# Patient Record
Sex: FEMALE | URBAN - METROPOLITAN AREA
[De-identification: names, ages, dates, MRNs, and addresses within clinical notes are randomized per-mention and may not be internally consistent; named-entity substitution may affect disease eponyms.]

---

## 2022-08-16 ENCOUNTER — OFFICE VISIT (OUTPATIENT)
Dept: HEMATOLOGY | Age: 82
End: 2022-08-16
Payer: MEDICARE

## 2022-08-16 ENCOUNTER — HOSPITAL ENCOUNTER (OUTPATIENT)
Dept: LAB | Age: 82
Discharge: HOME | End: 2022-08-16
Payer: MEDICARE

## 2022-08-16 VITALS
HEIGHT: 62 IN | HEART RATE: 81 BPM | DIASTOLIC BLOOD PRESSURE: 60 MMHG | WEIGHT: 125 LBS | TEMPERATURE: 97.8 F | BODY MASS INDEX: 23 KG/M2 | SYSTOLIC BLOOD PRESSURE: 106 MMHG | RESPIRATION RATE: 14 BRPM | OXYGEN SATURATION: 97 %

## 2022-08-16 DIAGNOSIS — R94.5 ABNORMAL RESULTS OF LIVER FUNCTION STUDIES: ICD-10-CM

## 2022-08-16 DIAGNOSIS — Z11.59 ENCOUNTER FOR SCREENING FOR OTHER VIRAL DISEASES: ICD-10-CM

## 2022-08-16 DIAGNOSIS — R74.8 ELEVATED LIVER ENZYMES: Primary | ICD-10-CM

## 2022-08-16 DIAGNOSIS — R74.8 ELEVATED LIVER ENZYMES: ICD-10-CM

## 2022-08-16 PROBLEM — K74.60 CIRRHOSIS OF LIVER WITHOUT ASCITES (HCC): Status: ACTIVE | Noted: 2022-08-16

## 2022-08-16 PROBLEM — F03.90 DEMENTIA (HCC): Status: ACTIVE | Noted: 2022-08-16

## 2022-08-16 PROBLEM — I10 HYPERTENSION: Status: ACTIVE | Noted: 2022-08-16

## 2022-08-16 LAB
ALBUMIN SERPL-MCNC: 2.7 G/DL (ref 3.4–5)
ALBUMIN/GLOB SERPL: 0.4 {RATIO} (ref 0.8–1.7)
ALP SERPL-CCNC: 136 U/L (ref 45–117)
ALT SERPL-CCNC: 29 U/L (ref 13–56)
ANION GAP SERPL CALC-SCNC: 5 MMOL/L (ref 3–18)
AST SERPL-CCNC: 55 U/L (ref 10–38)
BASOPHILS # BLD: 0 K/UL (ref 0–0.1)
BASOPHILS NFR BLD: 1 % (ref 0–2)
BILIRUB DIRECT SERPL-MCNC: 0.2 MG/DL (ref 0–0.2)
BILIRUB SERPL-MCNC: 0.4 MG/DL (ref 0.2–1)
BUN SERPL-MCNC: 21 MG/DL (ref 7–18)
BUN/CREAT SERPL: 15 (ref 12–20)
CALCIUM SERPL-MCNC: 8.4 MG/DL (ref 8.5–10.1)
CHLORIDE SERPL-SCNC: 113 MMOL/L (ref 100–111)
CO2 SERPL-SCNC: 23 MMOL/L (ref 21–32)
CREAT SERPL-MCNC: 1.4 MG/DL (ref 0.6–1.3)
DIFFERENTIAL METHOD BLD: ABNORMAL
EOSINOPHIL # BLD: 0.1 K/UL (ref 0–0.4)
EOSINOPHIL NFR BLD: 2 % (ref 0–5)
ERYTHROCYTE [DISTWIDTH] IN BLOOD BY AUTOMATED COUNT: 15.9 % (ref 11.6–14.5)
FERRITIN SERPL-MCNC: 13 NG/ML (ref 8–388)
GLOBULIN SER CALC-MCNC: 6.6 G/DL (ref 2–4)
GLUCOSE SERPL-MCNC: 87 MG/DL (ref 74–99)
HCT VFR BLD AUTO: 27.1 % (ref 35–45)
HGB BLD-MCNC: 8.3 G/DL (ref 12–16)
IMM GRANULOCYTES # BLD AUTO: 0 K/UL (ref 0–0.04)
IMM GRANULOCYTES NFR BLD AUTO: 0 % (ref 0–0.5)
INR PPP: 1.3 (ref 0.8–1.2)
IRON SATN MFR SERPL: 9 % (ref 20–50)
IRON SERPL-MCNC: 28 UG/DL (ref 50–175)
LYMPHOCYTES # BLD: 1.7 K/UL (ref 0.9–3.6)
LYMPHOCYTES NFR BLD: 32 % (ref 21–52)
MCH RBC QN AUTO: 28.7 PG (ref 24–34)
MCHC RBC AUTO-ENTMCNC: 30.6 G/DL (ref 31–37)
MCV RBC AUTO: 93.8 FL (ref 78–100)
MONOCYTES # BLD: 0.4 K/UL (ref 0.05–1.2)
MONOCYTES NFR BLD: 7 % (ref 3–10)
NEUTS SEG # BLD: 3 K/UL (ref 1.8–8)
NEUTS SEG NFR BLD: 57 % (ref 40–73)
NRBC # BLD: 0 K/UL (ref 0–0.01)
NRBC BLD-RTO: 0 PER 100 WBC
PLATELET # BLD AUTO: 137 K/UL (ref 135–420)
PMV BLD AUTO: 14.5 FL (ref 9.2–11.8)
POTASSIUM SERPL-SCNC: 4.2 MMOL/L (ref 3.5–5.5)
PROT SERPL-MCNC: 9.3 G/DL (ref 6.4–8.2)
PROTHROMBIN TIME: 16.2 SEC (ref 11.5–15.2)
RBC # BLD AUTO: 2.89 M/UL (ref 4.2–5.3)
SODIUM SERPL-SCNC: 141 MMOL/L (ref 136–145)
TIBC SERPL-MCNC: 326 UG/DL (ref 250–450)
WBC # BLD AUTO: 5.3 K/UL (ref 4.6–13.2)

## 2022-08-16 PROCEDURE — 85610 PROTHROMBIN TIME: CPT

## 2022-08-16 PROCEDURE — 36415 COLL VENOUS BLD VENIPUNCTURE: CPT

## 2022-08-16 PROCEDURE — 82107 ALPHA-FETOPROTEIN L3: CPT

## 2022-08-16 PROCEDURE — 82728 ASSAY OF FERRITIN: CPT

## 2022-08-16 PROCEDURE — 86038 ANTINUCLEAR ANTIBODIES: CPT

## 2022-08-16 PROCEDURE — G8432 DEP SCR NOT DOC, RNG: HCPCS | Performed by: NURSE PRACTITIONER

## 2022-08-16 PROCEDURE — G8427 DOCREV CUR MEDS BY ELIG CLIN: HCPCS | Performed by: NURSE PRACTITIONER

## 2022-08-16 PROCEDURE — 86803 HEPATITIS C AB TEST: CPT

## 2022-08-16 PROCEDURE — 86704 HEP B CORE ANTIBODY TOTAL: CPT

## 2022-08-16 PROCEDURE — 1090F PRES/ABSN URINE INCON ASSESS: CPT | Performed by: NURSE PRACTITIONER

## 2022-08-16 PROCEDURE — G8420 CALC BMI NORM PARAMETERS: HCPCS | Performed by: NURSE PRACTITIONER

## 2022-08-16 PROCEDURE — 83540 ASSAY OF IRON: CPT

## 2022-08-16 PROCEDURE — 86381 MITOCHONDRIAL ANTIBODY EACH: CPT

## 2022-08-16 PROCEDURE — 85025 COMPLETE CBC W/AUTO DIFF WBC: CPT

## 2022-08-16 PROCEDURE — 86706 HEP B SURFACE ANTIBODY: CPT

## 2022-08-16 PROCEDURE — 80076 HEPATIC FUNCTION PANEL: CPT

## 2022-08-16 PROCEDURE — G8536 NO DOC ELDER MAL SCRN: HCPCS | Performed by: NURSE PRACTITIONER

## 2022-08-16 PROCEDURE — 87340 HEPATITIS B SURFACE AG IA: CPT

## 2022-08-16 PROCEDURE — 86015 ACTIN ANTIBODY EACH: CPT

## 2022-08-16 PROCEDURE — 86708 HEPATITIS A ANTIBODY: CPT

## 2022-08-16 PROCEDURE — 1101F PT FALLS ASSESS-DOCD LE1/YR: CPT | Performed by: NURSE PRACTITIONER

## 2022-08-16 PROCEDURE — 1123F ACP DISCUSS/DSCN MKR DOCD: CPT | Performed by: NURSE PRACTITIONER

## 2022-08-16 PROCEDURE — 86037 ANCA TITER EACH ANTIBODY: CPT

## 2022-08-16 PROCEDURE — G0463 HOSPITAL OUTPT CLINIC VISIT: HCPCS | Performed by: NURSE PRACTITIONER

## 2022-08-16 PROCEDURE — 80048 BASIC METABOLIC PNL TOTAL CA: CPT

## 2022-08-16 PROCEDURE — 99205 OFFICE O/P NEW HI 60 MIN: CPT | Performed by: NURSE PRACTITIONER

## 2022-08-16 PROCEDURE — G8400 PT W/DXA NO RESULTS DOC: HCPCS | Performed by: NURSE PRACTITIONER

## 2022-08-16 RX ORDER — MEMANTINE HYDROCHLORIDE 10 MG/1
10 TABLET ORAL 2 TIMES DAILY
COMMUNITY
Start: 2022-07-26

## 2022-08-16 RX ORDER — METHOCARBAMOL 750 MG/1
TABLET, FILM COATED ORAL
COMMUNITY

## 2022-08-16 RX ORDER — HYDROCODONE BITARTRATE AND ACETAMINOPHEN 5; 325 MG/1; MG/1
TABLET ORAL
COMMUNITY
Start: 2022-07-18

## 2022-08-16 RX ORDER — DONEPEZIL HYDROCHLORIDE 10 MG/1
TABLET, FILM COATED ORAL
COMMUNITY
Start: 2022-08-10

## 2022-08-16 RX ORDER — PANTOPRAZOLE SODIUM 40 MG/1
40 TABLET, DELAYED RELEASE ORAL 2 TIMES DAILY
COMMUNITY
Start: 2022-07-18

## 2022-08-16 RX ORDER — BENAZEPRIL HYDROCHLORIDE AND HYDROCHLOROTHIAZIDE 20; 12.5 MG/1; MG/1
1 TABLET ORAL DAILY
COMMUNITY
Start: 2022-07-26

## 2022-08-16 NOTE — PROGRESS NOTES
[address] [facility], [place], [address], [place], [place]      [facility]     [facility]   JUANITA [facility]       Carter Henson [address]    at [place] [address] [address], [address] Evelio Noland  22. [phone]    FAX: [address] [place] [address], [address]    [phone]    FAX: [phone]       Patient Care Team:  Jaclyn Martinez DO as PCP - General (Family Medicine)  Fay Younger MD as Referring Provider (Hematology and Oncology)  Jaqui Mendez MD as Physician (Hematology and Oncology)  Rand Lofton MD as Physician (Gastroenterology)      The clinicians listed above have asked me to see Jae Orozco in consultation regarding suspected cirrhosis. All medical records sent by the referring physicians were reviewed     The patient is a 81 y.o. female who was found to have elevated liver enzymes since 2018      Serologic evaluation for markers of chronic liver disease has either not been performed or the results are not available. MRI of the liver was performed in 2022. The results of the imaging suggested cirrhosis. Previously described suspicious lesion in the subcapsular posterior right lobeof the liver appears unchanged in size    An assessment of liver fibrosis with biopsy, Fibroscan or elastography has not been performed. The patient had not started any new medications within 3 months preceding the elevation in liver chemistries.     The patient has the following symptoms which could be attributed to the liver disorder:  fatigue, diffuse abdominal pain, hematochezia (occasionally)     The patient is not experiencing the following symptoms which are commonly seen in this liver disorder: pain in the right side over the liver, nausea, swelling of the abdomen, swelling of the lower extremities, hematemesis. The patient has mild to moderate limitations in functional activities which can be attributed to the liver disease and to other medical problems that are not related to the liver disease. ASSESSMENT AND PLAN:  Cirrhosis  Cirrhosis of unclear etiology. The diagnosis of cirrhosis is based upon imaging and laboratory studies. AST is elevated. ALT is normal. ALP is elevated. Liver function is depressed. Serum albumin is depressed. The platelet count is depressed. Based upon laboratory studies and imaging the patient appears to have cirrhosis. Have performed laboratory testing to monitor liver function and degree of liver injury. This included   BMP, hepatic panel, CBC with platelet count, INR. Serologic testing for causes of chronic liver disease were ordered. Will perform additional serologic tests to screen for other causes of chronic liver disease. The patient has depressed but stable liver function. The patient has never developed any complications of cirrhosis to date. The CTP is 7. Child class B. The MELD score is 13. The patient is not a candidate for liver transplantation because of advanced age. Primary Biliary Cholangitis  The diagnosis is based upon serology and an elevation in ALP. A liver biopsy has not been performed. The AMA is strongly positive and the ALP is significantly elevated. The patient meets criteria for PBC and histologic confirmation is not necessary at this time. The patient will be started on SOREN at a dose of 800 mg every day. The side effects of SOREN including a 2% risk of itching and a 2% risk of diarrhea were discussed. Screening for Esophageal varices   The patient has not had an EGD to screen for varices.     She is scheduled for EGD to assess for varices and need for banding with Dr Andrea Lemus 9/2022. Hypercholesterolemia   This is common in patients with PBC. Controlled clinical trials demonstrate that women with PBC do not have an increased risk of CAD depsite the elevated total cholesterol. The cholesterol is typically HDL and does not always require treatment. Statins are not contraindicated if felt to be clinically warranted. Vitamin malabsorption  Patients with PBC do not efficiently absorb fat soluble vitamins A,D,E, K. It has been recommended that the patient take multivitamins with excess fat soluble vitamins. Breast cancer screeing   Women with PBC have an increased risk of breast cancer and should undergo regular mammography screenings. Hepatic encephalopathy   This has not developed to date. There is no need for treatment with lactulose and/or Xifaxan at this time. The patient has dementia and is on medication. 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 There is no evidence of overt bleeding. No treatment is required. 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 Will administer intravenous iron. Screening for Hepatocellular Carcinoma  [facility] [address] screening performed in 4/2022 demonstrated a \"previously described suspicious lesion\" unchanged. AFP was ordered today and dynamic MRI will be scheduled. Treatment of other medical problems in patients with chronic liver disease  There are no contraindications for the patient to take any medications that are necessary for treatment of other medical issues. Normal doses of acetaminophen can be used for pain as needed. Normal doses of acetaminophen as recommended on the label are not hepatotoxic, even in patients with cirrhosis. The patient has cirrhosis. Patients with cirrhosis should not use NSAIDs if possible as this is associated with a higher rate of ZELDA. Counseling for alcohol in patients with chronic liver disease  The patient was counseled regarding alcohol consumption and the effect of alcohol on chronic liver disease. The patient does not consume any alcohol. Osteoporosis  The risk of osteoporosis is increased in patients with cirrhosis. DEXA bone density to assess for osteoporosis has not been performed. This should be ordered by the patients primary care physician. Treatment of other medical problems in patients with chronic liver disease  There are no contraindications for the patient to take most medications that are necessary for treatment of other medical issues. The patient has cirrhosis and should avoid taking NSAIDs which are associated with a higher rate of developing ZELDA. The patient can take any medications utilized for treatment of DM, statins to treat hypercholesterolemia    Normal doses of acetaminophen, as recommended on the label of the bottle, are not hepatotoxic except in the setting of daily alcohol use, even in patients with cirrhosis and can be utilized for pain. Counseling for alcohol in patients with chronic liver disease  The patient has cirrhosis and was advised to be abstinent from all alcohol including non-alcoholic beer which does contain some alcohol. The patient does not consume any alcohol. Vaccinations   Vaccination for viral hepatitis B is recommended since the patient has no serologic evidence of previous exposure or vaccination with immunity. Vaccination for viral hepatitis A is not needed. The patient has serologic evidence of prior exposure or vaccination with immunity. The patient has not received of COVID-19 vaccine. Routine vaccinations against other bacterial and viral agents can be performed as indicated. Annual flu vaccination should be administered if indicated.       ALLERGIES  No Known Allergies    MEDICATIONS  Current Outpatient Medications   Medication Sig benazepril-hydroCHLOROthiazide (LOTENSIN HCT) 20-12.5 mg per tablet Take 1 Tablet by mouth in the morning. HYDROcodone-acetaminophen (NORCO) 5-325 mg per tablet take 1 tablet by mouth every 4 hours if needed for pain    donepeziL (ARICEPT) 10 mg tablet donepezil 10 mg tablet   take 1 tablet by mouth every evening    memantine (NAMENDA) 10 mg tablet Take 10 mg by mouth two (2) times a day. methocarbamoL (ROBAXIN) 750 mg tablet methocarbamol 750 mg tablet    pantoprazole (PROTONIX) 40 mg tablet Take 40 mg by mouth two (2) times a day. No current facility-administered medications for this visit. SYSTEM REVIEW NOT RELATED TO LIVER DISEASE OR REVIEWED ABOVE:  Constitution systems: Negative for fever, chills, weight gain, weight loss. Eyes: Negative for visual changes. ENT: Negative for sore throat, painful swallowing. Respiratory: Negative for cough, hemoptysis, SOB. Cardiology: Negative for chest pain, palpitations. GI:  Negative for constipation or diarrhea. : Negative for urinary frequency, dysuria, hematuria, nocturia. Skin: Negative for rash. Hematology: Negative for easy bruising, blood clots. Musculo-skelatal: Negative for back pain, muscle pain, weakness. Neurologic: Negative for headaches, dizziness, vertigo, memory problems not related to HE. Psychology: Negative for anxiety, depression. FAMILY HISTORY:  The patient has no knowledge of the father's medical condition. The patient has no knowledge of the mother's medical condition. SOCIAL HISTORY:  The patient is . The patient has 4 children, 1 grandchild. 1 great grandchild  The patient has never used tobacco products. The patient has never consumed significant amounts of alcohol. The patient is retired.       PHYSICAL EXAMINATION:  Visit Vitals  /60 (BP 1 Location: Left upper arm, BP Patient Position: Sitting)   Pulse 81   Temp 97.8 °F (36.6 °C) (Temporal)   Resp 14   Ht 5' 2\" (1.575 m)   Wt 125 lb (56.7 kg)   SpO2 97%   BMI 22.86 kg/m²     General: No acute distress. Eyes: Sclera anicteric. ENT: No oral lesions. Thyroid normal.  Nodes: No adenopathy. Skin: No spider angiomata. No jaundice. No palmar erythema. Respiratory: Lungs clear to auscultation. Cardiovascular: Regular heart rate. No murmurs. No JVD. Abdomen: Soft non-tender. Liver size normal to percussion/palpation. Spleen not palpable. No obvious ascites. Extremities: No edema. No muscle wasting. No gross arthritic changes. Neurologic: Alert and oriented. Cranial nerves grossly intact. No asterixis. LABORATORY STUDIES:  Liver [place] of [address] Units 8/16/2022   WBC 4.6 - 13.2 K/uL 5.3   ANC 1.8 - 8.0 K/UL 3.0   HGB 12.0 - 16.0 g/dL 8.3 (L)    - 420 K/uL 137   INR 0.8 - 1.2   1.3 (H)   AST 10 - 38 U/L 55 (H)   ALT 13 - 56 U/L 29   Alk Phos 45 - 117 U/L 136 (H)   Bili, Total 0.2 - 1.0 MG/DL 0.4   Bili, Direct 0.0 - 0.2 MG/DL 0.2   Albumin 3.4 - 5.0 g/dL 2.7 (L)   BUN 7.0 - 18 MG/DL 21 (H)   Creat 0.6 - 1.3 MG/DL 1.40 (H)   Na 136 - 145 mmol/L 141   K 3.5 - 5.5 mmol/L 4.2   Cl 100 - 111 mmol/L 113 (H)   CO2 21 - 32 mmol/L 23   Glucose 74 - 99 mg/dL 87       SEROLOGIES:  Serologies Latest Ref Rng & Units 8/16/2022   Hep A Ab, Total Negative   Positive (A)   Hep B Surface Ag <1.00 Index <0.10   Hep B Surface Ag Interp NEG   Negative   Hep B Core Ab, Total Negative   Negative   Hep B Surface Ab >10.0 mIU/mL <3.10 (L)   Hep B Surface Ab Interp POS   Negative (A)   Hep C Ab 0.0 - 0.9 s/co ratio <0.1   Ferritin 8 - 388 NG/ML 13   Iron % Saturation 20 - 50 % 9 (L)   ASMCA 0 - 19 Units 59 (H)   M2 Ab 0.0 - 20.0 Units 120.1 (H)       LIVER HISTOLOGY:  Not available or performed    ENDOSCOPIC PROCEDURES:  Not available or performed    RADIOLOGY:  4/2022. Dynamic MRI liver. Changes consistent with cirrhosis.  Previously described suspicious lesion in the subcapsular posterior right lobe of the liver appears unchanged in size. No dilated bile ducts. No bile duct strictures. No ascites. OTHER TESTING:  Not available or performed    FOLLOW-UP:  All of the issues listed above in the Assessment and Plan were discussed with the patient. All questions were answered. The patient expressed a clear understanding of the above. [address] [place] [address] in 4 weeks for Fibroscan and to review all data and determine the treatment plan.     Heriberto Pina, AGPCNP-BC  [address] [facility] [place] [facility]  [address], [address] [address]  Tanesha Mandri, [address]  [phone]

## 2022-08-17 LAB
AFP L3 MFR SERPL: NORMAL % (ref 0–9.9)
AFP SERPL-MCNC: 1.4 NG/ML (ref 0–8.7)
HAV AB SER QL IA: POSITIVE
HBV CORE AB SERPL QL IA: NEGATIVE
HBV SURFACE AB SER QL IA: NEGATIVE
HBV SURFACE AB SERPL IA-ACNC: <3.1 MIU/ML
HBV SURFACE AG SER QL: <0.1 INDEX
HBV SURFACE AG SER QL: NEGATIVE
HCV AB S/CO SERPL IA: <0.1 S/CO RATIO (ref 0–0.9)
HCV AB SERPL QL IA: NORMAL
HEP BS AB COMMENT,HBSAC: ABNORMAL
MITOCHONDRIA M2 IGG SER-ACNC: 120.1 UNITS (ref 0–20)
SMA IGG SER-ACNC: 59 UNITS (ref 0–19)

## 2022-08-19 LAB
C-ANCA TITR SER IF: NORMAL TITER
P-ANCA ATYPICAL TITR SER IF: NORMAL TITER
P-ANCA TITR SER IF: NORMAL TITER

## 2022-08-22 RX ORDER — URSODIOL 300 MG/1
300 CAPSULE ORAL DAILY
Qty: 90 CAPSULE | Refills: 3 | Status: SHIPPED | OUTPATIENT
Start: 2022-08-22

## 2022-08-22 RX ORDER — URSODIOL 500 MG/1
500 TABLET, FILM COATED ORAL DAILY
Qty: 90 TABLET | Refills: 3 | Status: SHIPPED | OUTPATIENT
Start: 2022-08-22

## 2022-08-25 LAB — ANA TITR SER IF: NEGATIVE {TITER}

## 2022-08-29 PROBLEM — D50.9 IRON DEFICIENCY ANEMIA, UNSPECIFIED: Status: ACTIVE | Noted: 2022-08-29

## 2022-08-29 RX ORDER — DIPHENHYDRAMINE HYDROCHLORIDE 50 MG/ML
25 INJECTION, SOLUTION INTRAMUSCULAR; INTRAVENOUS AS NEEDED
Status: CANCELLED
Start: 2022-09-01

## 2022-08-29 RX ORDER — HYDROCORTISONE SODIUM SUCCINATE 100 MG/2ML
100 INJECTION, POWDER, FOR SOLUTION INTRAMUSCULAR; INTRAVENOUS AS NEEDED
Status: CANCELLED | OUTPATIENT
Start: 2022-09-01

## 2022-08-29 RX ORDER — ALBUTEROL SULFATE 0.83 MG/ML
2.5 SOLUTION RESPIRATORY (INHALATION) AS NEEDED
Status: CANCELLED
Start: 2022-09-01

## 2022-08-29 RX ORDER — HEPARIN 100 UNIT/ML
500 SYRINGE INTRAVENOUS AS NEEDED
Status: CANCELLED
Start: 2022-09-01

## 2022-08-29 RX ORDER — SODIUM CHLORIDE 9 MG/ML
5-250 INJECTION, SOLUTION INTRAVENOUS AS NEEDED
Status: CANCELLED | OUTPATIENT
Start: 2022-09-01

## 2022-08-29 RX ORDER — DIPHENHYDRAMINE HYDROCHLORIDE 50 MG/ML
50 INJECTION, SOLUTION INTRAMUSCULAR; INTRAVENOUS AS NEEDED
Status: CANCELLED
Start: 2022-09-01

## 2022-08-29 RX ORDER — SODIUM CHLORIDE 9 MG/ML
5-40 INJECTION INTRAMUSCULAR; INTRAVENOUS; SUBCUTANEOUS AS NEEDED
Status: CANCELLED | OUTPATIENT
Start: 2022-09-01

## 2022-08-29 RX ORDER — ACETAMINOPHEN 325 MG/1
650 TABLET ORAL AS NEEDED
Status: CANCELLED
Start: 2022-09-01

## 2022-08-29 RX ORDER — ONDANSETRON 2 MG/ML
8 INJECTION INTRAMUSCULAR; INTRAVENOUS AS NEEDED
Status: CANCELLED | OUTPATIENT
Start: 2022-09-01

## 2022-08-29 RX ORDER — EPINEPHRINE 1 MG/ML
0.3 INJECTION, SOLUTION, CONCENTRATE INTRAVENOUS AS NEEDED
Status: CANCELLED | OUTPATIENT
Start: 2022-09-01

## 2022-09-01 ENCOUNTER — TELEPHONE (OUTPATIENT)
Dept: HEMATOLOGY | Age: 82
End: 2022-09-01

## 2022-09-01 ENCOUNTER — HOSPITAL ENCOUNTER (OUTPATIENT)
Dept: INFUSION THERAPY | Age: 82
Discharge: HOME | End: 2022-09-01
Payer: MEDICARE

## 2022-09-01 VITALS
RESPIRATION RATE: 16 BRPM | TEMPERATURE: 97.8 F | OXYGEN SATURATION: 100 % | HEART RATE: 75 BPM | DIASTOLIC BLOOD PRESSURE: 50 MMHG | SYSTOLIC BLOOD PRESSURE: 109 MMHG

## 2022-09-01 DIAGNOSIS — D50.9 IRON DEFICIENCY ANEMIA, UNSPECIFIED IRON DEFICIENCY ANEMIA TYPE: Primary | ICD-10-CM

## 2022-09-01 PROCEDURE — 96365 THER/PROPH/DIAG IV INF INIT: CPT

## 2022-09-01 PROCEDURE — 74011000258 HC RX REV CODE- 258: Performed by: NURSE PRACTITIONER

## 2022-09-01 PROCEDURE — 74011000250 HC RX REV CODE- 250: Performed by: NURSE PRACTITIONER

## 2022-09-01 PROCEDURE — 74011250636 HC RX REV CODE- 250/636: Performed by: NURSE PRACTITIONER

## 2022-09-01 RX ORDER — DIPHENHYDRAMINE HYDROCHLORIDE 50 MG/ML
25 INJECTION, SOLUTION INTRAMUSCULAR; INTRAVENOUS AS NEEDED
Status: CANCELLED
Start: 2022-09-08

## 2022-09-01 RX ORDER — ONDANSETRON 2 MG/ML
8 INJECTION INTRAMUSCULAR; INTRAVENOUS AS NEEDED
Status: CANCELLED | OUTPATIENT
Start: 2022-09-08

## 2022-09-01 RX ORDER — HYDROCORTISONE SODIUM SUCCINATE 100 MG/2ML
100 INJECTION, POWDER, FOR SOLUTION INTRAMUSCULAR; INTRAVENOUS AS NEEDED
Status: CANCELLED | OUTPATIENT
Start: 2022-09-08

## 2022-09-01 RX ORDER — SODIUM CHLORIDE 9 MG/ML
5-250 INJECTION, SOLUTION INTRAVENOUS AS NEEDED
Status: DISPENSED | OUTPATIENT
Start: 2022-09-01 | End: 2022-09-01

## 2022-09-01 RX ORDER — HEPARIN 100 UNIT/ML
500 SYRINGE INTRAVENOUS AS NEEDED
Status: CANCELLED
Start: 2022-09-08

## 2022-09-01 RX ORDER — EPINEPHRINE 1 MG/ML
0.3 INJECTION, SOLUTION, CONCENTRATE INTRAVENOUS AS NEEDED
Status: CANCELLED | OUTPATIENT
Start: 2022-09-08

## 2022-09-01 RX ORDER — SODIUM CHLORIDE 9 MG/ML
5-250 INJECTION, SOLUTION INTRAVENOUS AS NEEDED
Status: CANCELLED | OUTPATIENT
Start: 2022-09-08

## 2022-09-01 RX ORDER — ACETAMINOPHEN 325 MG/1
650 TABLET ORAL AS NEEDED
Status: CANCELLED
Start: 2022-09-08

## 2022-09-01 RX ORDER — SODIUM CHLORIDE 0.9 % (FLUSH) 0.9 %
5-40 SYRINGE (ML) INJECTION AS NEEDED
Status: CANCELLED | OUTPATIENT
Start: 2022-09-08

## 2022-09-01 RX ORDER — SODIUM CHLORIDE 9 MG/ML
5-40 INJECTION INTRAMUSCULAR; INTRAVENOUS; SUBCUTANEOUS AS NEEDED
Status: CANCELLED | OUTPATIENT
Start: 2022-09-08

## 2022-09-01 RX ORDER — ALBUTEROL SULFATE 0.83 MG/ML
2.5 SOLUTION RESPIRATORY (INHALATION) AS NEEDED
Status: CANCELLED
Start: 2022-09-08

## 2022-09-01 RX ORDER — SODIUM CHLORIDE 0.9 % (FLUSH) 0.9 %
5-40 SYRINGE (ML) INJECTION AS NEEDED
Status: DISPENSED | OUTPATIENT
Start: 2022-09-01 | End: 2022-09-01

## 2022-09-01 RX ORDER — DIPHENHYDRAMINE HYDROCHLORIDE 50 MG/ML
50 INJECTION, SOLUTION INTRAMUSCULAR; INTRAVENOUS AS NEEDED
Status: CANCELLED
Start: 2022-09-08

## 2022-09-01 RX ADMIN — SODIUM CHLORIDE, PRESERVATIVE FREE 10 ML: 5 INJECTION INTRAVENOUS at 11:12

## 2022-09-01 RX ADMIN — SODIUM CHLORIDE, PRESERVATIVE FREE 10 ML: 5 INJECTION INTRAVENOUS at 11:37

## 2022-09-01 RX ADMIN — FERRIC CARBOXYMALTOSE INJECTION 750 MG: 50 INJECTION, SOLUTION INTRAVENOUS at 10:50

## 2022-09-01 RX ADMIN — SODIUM CHLORIDE 250 ML/HR: 9 INJECTION, SOLUTION INTRAVENOUS at 10:50

## 2022-09-01 NOTE — TELEPHONE ENCOUNTER
Received a message today 9/1/22 to follow up with patient due to having back pain after taking Hernandez.     Contacted patient to follow up, no answer, voicemail full

## 2022-09-01 NOTE — PROGRESS NOTES
[facility] Progress Note    Date: 2022    Name: Maria G Flowers    MRN: [id number]         : 1940    Injectafer Infusion 1 of 2    Ms. Hughes to [facility], ambulatory, at 1000. Pt was assessed and education was provided. Ms. Yoselin Mejia vitals were reviewed and patient was observed for 5 minutes prior to treatment. Visit Vitals  BP (!) 109/50 (BP 1 Location: Right arm, BP Patient Position: Sitting)   Pulse 75   Temp 97.8 °F (36.6 °C)   Resp 16   SpO2 100%   Breastfeeding No         24 g PIV placed in right arm x 1 attempt. PIV flushed easily and had brisk blood return. Injectafer 750 mg was to infuse over 20 minutes. VS stable and pt denied complaints of itching, lip/tongue/facial swelling, SOB, CP or other complaints. Pt was observed for 30 minutes post infusion. Ms. John Ruffin tolerated the infusion, and had no complaints. VS remained stable. PIV flushed with NS 10 ml and removed. No bleeding or hematoma noted at site. Guaze and coban applied. Reviewed discharge instructions with patient, including expected side effects (abdominal cramping, nausea, changes in color of urine or feces) and signs of allergic reaction requiring medical attention (itching/hives/rashes, SOB, chest pain, lip/tongue/facial swelling). Patient given printed copy to take home. Patient verbalized understanding of discharge instructions. Patient armband removed and shredded. Ms. John Ruffin was discharged from [place] [address] in stable condition at 1140. She is to return on 2022 at 1000 for 2/2 Injectafer treatment.     Erlin Solorzano RN  2022  12:16 PM

## 2022-09-08 ENCOUNTER — HOSPITAL ENCOUNTER (OUTPATIENT)
Dept: INFUSION THERAPY | Age: 82
Discharge: HOME | End: 2022-09-08
Payer: MEDICARE

## 2022-09-08 VITALS
TEMPERATURE: 98 F | HEART RATE: 68 BPM | DIASTOLIC BLOOD PRESSURE: 60 MMHG | RESPIRATION RATE: 16 BRPM | OXYGEN SATURATION: 100 % | SYSTOLIC BLOOD PRESSURE: 130 MMHG

## 2022-09-08 DIAGNOSIS — D50.9 IRON DEFICIENCY ANEMIA, UNSPECIFIED IRON DEFICIENCY ANEMIA TYPE: Primary | ICD-10-CM

## 2022-09-08 PROCEDURE — 74011000250 HC RX REV CODE- 250: Performed by: NURSE PRACTITIONER

## 2022-09-08 PROCEDURE — 74011250636 HC RX REV CODE- 250/636: Performed by: NURSE PRACTITIONER

## 2022-09-08 PROCEDURE — 96365 THER/PROPH/DIAG IV INF INIT: CPT

## 2022-09-08 PROCEDURE — 74011000258 HC RX REV CODE- 258: Performed by: NURSE PRACTITIONER

## 2022-09-08 RX ORDER — ONDANSETRON 2 MG/ML
8 INJECTION INTRAMUSCULAR; INTRAVENOUS AS NEEDED
Status: CANCELLED | OUTPATIENT
Start: 2022-09-15

## 2022-09-08 RX ORDER — DIPHENHYDRAMINE HYDROCHLORIDE 50 MG/ML
50 INJECTION, SOLUTION INTRAMUSCULAR; INTRAVENOUS AS NEEDED
Status: CANCELLED
Start: 2022-09-15

## 2022-09-08 RX ORDER — SODIUM CHLORIDE 9 MG/ML
5-250 INJECTION, SOLUTION INTRAVENOUS AS NEEDED
Status: CANCELLED | OUTPATIENT
Start: 2022-09-15

## 2022-09-08 RX ORDER — SODIUM CHLORIDE 0.9 % (FLUSH) 0.9 %
5-40 SYRINGE (ML) INJECTION AS NEEDED
Status: CANCELLED | OUTPATIENT
Start: 2022-09-15

## 2022-09-08 RX ORDER — DIPHENHYDRAMINE HYDROCHLORIDE 50 MG/ML
25 INJECTION, SOLUTION INTRAMUSCULAR; INTRAVENOUS AS NEEDED
Status: CANCELLED
Start: 2022-09-15

## 2022-09-08 RX ORDER — HYDROCORTISONE SODIUM SUCCINATE 100 MG/2ML
100 INJECTION, POWDER, FOR SOLUTION INTRAMUSCULAR; INTRAVENOUS AS NEEDED
Status: CANCELLED | OUTPATIENT
Start: 2022-09-15

## 2022-09-08 RX ORDER — ACETAMINOPHEN 325 MG/1
650 TABLET ORAL AS NEEDED
Status: CANCELLED
Start: 2022-09-15

## 2022-09-08 RX ORDER — ALBUTEROL SULFATE 0.83 MG/ML
2.5 SOLUTION RESPIRATORY (INHALATION) AS NEEDED
Status: CANCELLED
Start: 2022-09-15

## 2022-09-08 RX ORDER — SODIUM CHLORIDE 9 MG/ML
5-250 INJECTION, SOLUTION INTRAVENOUS AS NEEDED
Status: DISPENSED | OUTPATIENT
Start: 2022-09-08 | End: 2022-09-08

## 2022-09-08 RX ORDER — SODIUM CHLORIDE 0.9 % (FLUSH) 0.9 %
5-40 SYRINGE (ML) INJECTION AS NEEDED
Status: DISPENSED | OUTPATIENT
Start: 2022-09-08 | End: 2022-09-08

## 2022-09-08 RX ORDER — SODIUM CHLORIDE 9 MG/ML
5-40 INJECTION INTRAMUSCULAR; INTRAVENOUS; SUBCUTANEOUS AS NEEDED
Status: CANCELLED | OUTPATIENT
Start: 2022-09-15

## 2022-09-08 RX ORDER — EPINEPHRINE 1 MG/ML
0.3 INJECTION, SOLUTION, CONCENTRATE INTRAVENOUS AS NEEDED
Status: CANCELLED | OUTPATIENT
Start: 2022-09-15

## 2022-09-08 RX ORDER — HEPARIN 100 UNIT/ML
500 SYRINGE INTRAVENOUS AS NEEDED
Status: CANCELLED
Start: 2022-09-15

## 2022-09-08 RX ADMIN — SODIUM CHLORIDE 50 ML/HR: 9 INJECTION, SOLUTION INTRAVENOUS at 11:52

## 2022-09-08 RX ADMIN — FERRIC CARBOXYMALTOSE INJECTION 750 MG: 50 INJECTION, SOLUTION INTRAVENOUS at 11:52

## 2022-09-08 RX ADMIN — SODIUM CHLORIDE, PRESERVATIVE FREE 10 ML: 5 INJECTION INTRAVENOUS at 12:23

## 2022-09-08 NOTE — PROGRESS NOTES
[facility] Progress Note    Date: 2022    Name: Catalino Hall    MRN: [id number]         : 1940    Injectafer Infusion 2 of 2    Ms. Nancie Ornelas to [place], [facility], at 1030 for second dose of Injectafer accompanied by her friend. Pt was assessed and education was provided. Ms. Shellie Saucedo vitals were reviewed and patient was observed for 5 minutes prior to treatment. Visit Vitals  /60 (BP 1 Location: Right arm, BP Patient Position: Sitting)   Pulse 68   Temp 98 °F (36.7 °C)   Resp 16   SpO2 100%         24 g PIV placed in right arm x 1 attempt. PIV flushed easily and had brisk blood return. Injectafer 750 mg was to infuse over 20 minutes. VS stable and pt denied complaints of itching, lip/tongue/facial swelling, SOB, CP or other complaints. Ms. aNncie Ornelas tolerated the infusion, and had no complaints. VS remained stable. PIV flushed with NS 10 ml and removed. No bleeding or hematoma noted at site. Guaze and coban applied. Reviewed discharge instructions with patient, including expected side effects (abdominal cramping, nausea, changes in color of urine or feces) and signs of allergic reaction requiring medical attention. Pt verbalized understanding. Patient armband removed and shredded. Ms. Nancie Ornelas was discharged from [place] [address] in stable condition at [phone]. She has no further appointments scheduled for [facility] at this time.     Corinne France, RN  2022  12:16 PM

## 2022-09-22 ENCOUNTER — TELEPHONE (OUTPATIENT)
Dept: HEMATOLOGY | Age: 82
End: 2022-09-22

## 2022-09-22 NOTE — TELEPHONE ENCOUNTER
Called patient to follow up and see if she was still experiencing back pain and if she is still currently off Hernandez rx. Patient states she is currently still off the Hernandez rx and back pain is still present but minimum. Patient was already aware that JOHN Hughes believes that the back pain was not from [place]. Patient reminded of follow up fibroscan appointment and instructions.  Patient verbalized understanding

## 2022-11-24 ENCOUNTER — APPOINTMENT (OUTPATIENT)
Dept: GENERAL RADIOLOGY | Age: 82
End: 2022-11-24
Attending: EMERGENCY MEDICINE
Payer: MEDICARE

## 2022-11-24 ENCOUNTER — HOSPITAL ENCOUNTER (EMERGENCY)
Age: 82
Discharge: HOME | End: 2022-11-24
Attending: EMERGENCY MEDICINE
Payer: MEDICARE

## 2022-11-24 VITALS
OXYGEN SATURATION: 100 % | TEMPERATURE: 97.3 F | BODY MASS INDEX: 22.5 KG/M2 | RESPIRATION RATE: 15 BRPM | DIASTOLIC BLOOD PRESSURE: 52 MMHG | HEART RATE: 87 BPM | WEIGHT: 123 LBS | SYSTOLIC BLOOD PRESSURE: 122 MMHG

## 2022-11-24 DIAGNOSIS — R06.89 RESPIRATORY DEPRESSION: ICD-10-CM

## 2022-11-24 DIAGNOSIS — T40.2X4D OPIOID OVERDOSE, UNDETERMINED INTENT, SUBSEQUENT ENCOUNTER: Primary | ICD-10-CM

## 2022-11-24 LAB
ALBUMIN SERPL-MCNC: 2.5 G/DL (ref 3.4–5)
ALBUMIN/GLOB SERPL: 0.4 {RATIO} (ref 0.8–1.7)
ALP SERPL-CCNC: 197 U/L (ref 45–117)
ALT SERPL-CCNC: 25 U/L (ref 13–56)
AMMONIA PLAS-SCNC: 24 UMOL/L (ref 11–32)
AMPHET UR QL SCN: NEGATIVE
ANION GAP SERPL CALC-SCNC: 9 MMOL/L (ref 3–18)
APPEARANCE UR: CLEAR
AST SERPL-CCNC: 48 U/L (ref 10–38)
BACTERIA URNS QL MICRO: ABNORMAL /HPF
BARBITURATES UR QL SCN: NEGATIVE
BASOPHILS # BLD: 0 K/UL (ref 0–0.1)
BASOPHILS NFR BLD: 1 % (ref 0–2)
BENZODIAZ UR QL: NEGATIVE
BILIRUB SERPL-MCNC: 0.7 MG/DL (ref 0.2–1)
BILIRUB UR QL: NEGATIVE
BUN SERPL-MCNC: 16 MG/DL (ref 7–18)
BUN/CREAT SERPL: 10 (ref 12–20)
CALCIUM SERPL-MCNC: 8.6 MG/DL (ref 8.5–10.1)
CANNABINOIDS UR QL SCN: NEGATIVE
CHLORIDE SERPL-SCNC: 111 MMOL/L (ref 100–111)
CO2 SERPL-SCNC: 19 MMOL/L (ref 21–32)
COCAINE UR QL SCN: NEGATIVE
COLOR UR: ABNORMAL
CREAT SERPL-MCNC: 1.59 MG/DL (ref 0.6–1.3)
DIFFERENTIAL METHOD BLD: ABNORMAL
EOSINOPHIL # BLD: 0.1 K/UL (ref 0–0.4)
EOSINOPHIL NFR BLD: 2 % (ref 0–5)
EPITH CASTS URNS QL MICRO: ABNORMAL /LPF (ref 0–5)
ERYTHROCYTE [DISTWIDTH] IN BLOOD BY AUTOMATED COUNT: 16.3 % (ref 11.6–14.5)
GLOBULIN SER CALC-MCNC: 6.1 G/DL (ref 2–4)
GLUCOSE SERPL-MCNC: 117 MG/DL (ref 74–99)
GLUCOSE UR STRIP.AUTO-MCNC: NEGATIVE MG/DL
HCT VFR BLD AUTO: 34.4 % (ref 35–45)
HDSCOM,HDSCOM: ABNORMAL
HGB BLD-MCNC: 11.4 G/DL (ref 12–16)
HGB UR QL STRIP: NEGATIVE
HYALINE CASTS URNS QL MICRO: ABNORMAL /LPF (ref 0–2)
IMM GRANULOCYTES # BLD AUTO: 0 K/UL (ref 0–0.04)
IMM GRANULOCYTES NFR BLD AUTO: 1 % (ref 0–0.5)
KETONES UR QL STRIP.AUTO: ABNORMAL MG/DL
LEUKOCYTE ESTERASE UR QL STRIP.AUTO: ABNORMAL
LIPASE SERPL-CCNC: 129 U/L (ref 73–393)
LYMPHOCYTES # BLD: 1.6 K/UL (ref 0.9–3.6)
LYMPHOCYTES NFR BLD: 28 % (ref 21–52)
MAGNESIUM SERPL-MCNC: 1.6 MG/DL (ref 1.6–2.6)
MCH RBC QN AUTO: 32.3 PG (ref 24–34)
MCHC RBC AUTO-ENTMCNC: 33.1 G/DL (ref 31–37)
MCV RBC AUTO: 97.5 FL (ref 78–100)
METHADONE UR QL: NEGATIVE
MONOCYTES # BLD: 0.3 K/UL (ref 0.05–1.2)
MONOCYTES NFR BLD: 5 % (ref 3–10)
NEUTS SEG # BLD: 3.7 K/UL (ref 1.8–8)
NEUTS SEG NFR BLD: 65 % (ref 40–73)
NITRITE UR QL STRIP.AUTO: NEGATIVE
NRBC # BLD: 0 K/UL (ref 0–0.01)
NRBC BLD-RTO: 0 PER 100 WBC
OPIATES UR QL: POSITIVE
PCP UR QL: NEGATIVE
PH UR STRIP: 5 [PH] (ref 5–8)
PLATELET # BLD AUTO: 117 K/UL (ref 135–420)
PMV BLD AUTO: 13.7 FL (ref 9.2–11.8)
POTASSIUM SERPL-SCNC: 4 MMOL/L (ref 3.5–5.5)
PROT SERPL-MCNC: 8.6 G/DL (ref 6.4–8.2)
PROT UR STRIP-MCNC: ABNORMAL MG/DL
RBC # BLD AUTO: 3.53 M/UL (ref 4.2–5.3)
RBC #/AREA URNS HPF: ABNORMAL /HPF (ref 0–5)
SODIUM SERPL-SCNC: 139 MMOL/L (ref 136–145)
SP GR UR REFRACTOMETRY: 1.02 (ref 1–1.03)
TROPONIN-HIGH SENSITIVITY: 7 NG/L (ref 0–54)
UROBILINOGEN UR QL STRIP.AUTO: 1 EU/DL (ref 0.2–1)
WBC # BLD AUTO: 5.7 K/UL (ref 4.6–13.2)
WBC URNS QL MICRO: ABNORMAL /HPF (ref 0–5)

## 2022-11-24 PROCEDURE — 99285 EMERGENCY DEPT VISIT HI MDM: CPT

## 2022-11-24 PROCEDURE — 80053 COMPREHEN METABOLIC PANEL: CPT

## 2022-11-24 PROCEDURE — 71045 X-RAY EXAM CHEST 1 VIEW: CPT

## 2022-11-24 PROCEDURE — 74011250636 HC RX REV CODE- 250/636: Performed by: EMERGENCY MEDICINE

## 2022-11-24 PROCEDURE — 83735 ASSAY OF MAGNESIUM: CPT

## 2022-11-24 PROCEDURE — 82140 ASSAY OF AMMONIA: CPT

## 2022-11-24 PROCEDURE — 96374 THER/PROPH/DIAG INJ IV PUSH: CPT

## 2022-11-24 PROCEDURE — 85025 COMPLETE CBC W/AUTO DIFF WBC: CPT

## 2022-11-24 PROCEDURE — 80307 DRUG TEST PRSMV CHEM ANLYZR: CPT

## 2022-11-24 PROCEDURE — 81001 URINALYSIS AUTO W/SCOPE: CPT

## 2022-11-24 PROCEDURE — 83690 ASSAY OF LIPASE: CPT

## 2022-11-24 PROCEDURE — 93005 ELECTROCARDIOGRAM TRACING: CPT

## 2022-11-24 PROCEDURE — 84484 ASSAY OF TROPONIN QUANT: CPT

## 2022-11-24 RX ORDER — NALOXONE HYDROCHLORIDE 4 MG/.1ML
SPRAY NASAL
Qty: 1 EACH | Refills: 0 | Status: SHIPPED | OUTPATIENT
Start: 2022-11-24

## 2022-11-24 RX ORDER — NALOXONE HYDROCHLORIDE 1 MG/ML
2 INJECTION INTRAMUSCULAR; INTRAVENOUS; SUBCUTANEOUS
Status: COMPLETED | OUTPATIENT
Start: 2022-11-24 | End: 2022-11-24

## 2022-11-24 RX ORDER — NALOXONE HYDROCHLORIDE 0.4 MG/ML
2 INJECTION, SOLUTION INTRAMUSCULAR; INTRAVENOUS; SUBCUTANEOUS
Status: COMPLETED | OUTPATIENT
Start: 2022-11-24 | End: 2022-11-24

## 2022-11-24 RX ADMIN — NALOXONE HYDROCHLORIDE 2 MG: 1 INJECTION PARENTERAL at 14:51

## 2022-11-24 RX ADMIN — NALXONE HYDROCHLORIDE 2 MG: 0.4 INJECTION INTRAMUSCULAR; INTRAVENOUS; SUBCUTANEOUS at 15:10

## 2022-11-24 NOTE — ED PROVIDER NOTES
EMERGENCY DEPARTMENT HISTORY AND PHYSICAL EXAM    Date: 11/24/2022  Patient Name: Krystian Braden    History of Presenting Illness     Chief Complaint   Patient presents with    Drug Overdose    Unresponsive       History Provided By:  Manuel Virk      History Sumaya Sorto):   2:54 PM  Krystian Braden is a 81 y.o. female with a PMHX of hypertension, dementia, cirrhosis  who presents to the emergency department (room 5) by POV to the ambulance bay C/O altered mental status onset immediately prior to arrival. Associated sxs include respiratory depression, decreased responsiveness. Patient is unable to confirm or deny any further symptoms or complaints due to mental status changes. Per patient's family, they were [facility] and she felt ill like her chest was hurting. They gave her 1 Vicodin per the family. She became unresponsive shortly after. On arrival to the ED she was in acute distress with poor responsiveness and poor respirations. Chief Complaint: Mental status changes  Onset: Today  Timing:  Acute  Context:  Vicodin brought symptoms on, symptoms have rapidly worsened since onset  Location: Generalized  Quality:  Unresponsive  Severity: Severe  Modifying Factors:  Narcan  makes it better, Vicodin makes it worse. Associated Symptoms:  Patient is unable to confirm or deny any further symptoms or complaints due to mental status changes    PCP: Raúl Ovalle DO     Past History         Past Medical History:  Past Medical History:   Diagnosis Date    Dementia ([address]     Hypertension        Past Surgical History:  Past Surgical History:   Procedure Laterality Date    HX HYSTERECTOMY         Family History:  History reviewed. No pertinent family history.   Reviewed and non-contributory    Social History:  Social History     Tobacco Use    Smoking status: Never    Smokeless tobacco: Never   Vaping Use    Vaping Use: Never used   Substance Use Topics    Alcohol use: Never    Drug use: Yes     Types: Opiates Medications:  Current Outpatient Medications   Medication Sig Dispense Refill    naloxone (NARCAN) 4 mg/actuation nasal spray Use 1 spray intranasally, then discard. Repeat with new spray every 2 min as needed for opioid overdose symptoms, alternating nostrils. 1 Each 0    ursodioL (SOREN Forte) 500 mg tablet Take 1 Tablet by mouth daily. 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 HYDROcodone-acetaminophen (NORCO) 5-325 mg per tablet take 1 tablet by mouth every 4 hours if needed for pain      donepeziL (ARICEPT) 10 mg tablet donepezil 10 mg tablet   take 1 tablet by mouth every evening      memantine (NAMENDA) 10 mg tablet Take 10 mg by mouth two (2) times a day. methocarbamoL (ROBAXIN) 750 mg tablet methocarbamol 750 mg tablet      pantoprazole (PROTONIX) 40 mg tablet Take 40 mg by mouth two (2) times a day.          Allergies:  No Known Allergies    Social Determinants of Health:  Social Determinants of Health     Tobacco Use: Low Risk     Smoking Tobacco Use: Never    Smokeless Tobacco Use: Never    Passive Exposure: Not on file   Alcohol Use: Not on file   Financial Resource Strain: Not on file   Food Insecurity: Not on file   Transportation Needs: Not on file   Physical Activity: Not on file   Stress: Not on file   Social Connections: Not on file   Intimate Partner Violence: Not on file   Depression: Not at risk    PHQ-2 Score: 0   Housing Stability: Not on file       Review of Systems      Review of Systems   Unable to perform ROS: Mental status change     Physical Exam     Vitals:    11/24/22 1458 11/24/22 1500 11/24/22 1515   BP: 114/78 131/67    Pulse: 71 67 81   Resp: 16  22   Temp: (!) 96 °F (35.6 °C)     SpO2: 100%  100%   Weight: 55.8 kg (123 lb)         Physical Exam  Vitals and nursing note reviewed. Constitutional:       General: She is in acute distress. Appearance: Normal appearance. She is normal weight. She is not ill-appearing. HENT:      Head: Normocephalic and atraumatic. Nose: Nose normal. No rhinorrhea. Mouth/Throat:      Mouth: Mucous membranes are moist.      Pharynx: No oropharyngeal exudate or posterior oropharyngeal erythema. Eyes:      Extraocular Movements: Extraocular movements intact. Conjunctiva/sclera: Conjunctivae normal.      Pupils: Pupils are equal, round, and reactive to light. Right eye: Pupil is sluggish. Left eye: Pupil is sluggish. Comments: Bilateral pinpoint pupils   Cardiovascular:      Rate and Rhythm: Regular rhythm. Bradycardia present. Heart sounds: No murmur heard. No friction rub. No gallop. Pulmonary:      Effort: Pulmonary effort is normal. Bradypnea present. No respiratory distress. Breath sounds: Normal breath sounds. Decreased air movement present. No wheezing, rhonchi or rales. Abdominal:      General: Bowel sounds are normal.      Palpations: Abdomen is soft. Tenderness: There is no abdominal tenderness. There is no guarding or rebound. Musculoskeletal:         General: No swelling, tenderness or deformity. Normal range of motion. Cervical back: Normal range of motion and neck supple. No rigidity. Lymphadenopathy:      Cervical: No cervical adenopathy. Skin:     General: Skin is warm and dry. Findings: No rash. Neurological:      General: No focal deficit present. GCS: GCS eye subscore is 3. GCS verbal subscore is 2. GCS motor subscore is 4.    Psychiatric:         Mood and Affect: Mood normal.         Behavior: Behavior normal.       Diagnostic Study Results     Labs -  Recent Results (from the past 12 hour(s))   EKG, 12 LEAD, INITIAL    Collection Time: 11/24/22  2:53 PM   Result Value Ref Range    Ventricular Rate 86 BPM    Atrial Rate 86 BPM    P-R Interval 154 ms    QRS Duration 80 ms    Q-T Interval 420 ms    QTC Calculation (Bezet) 502 ms    Calculated P Axis 7 degrees    Calculated R Axis 31 degrees    Calculated T Axis 43 degrees    Diagnosis       Normal sinus rhythm  Nonspecific ST and T wave abnormality  Abnormal ECG  No previous ECGs available     CBC WITH AUTOMATED DIFF    Collection Time: 11/24/22  3:00 PM   Result Value Ref Range    WBC 5.7 4.6 - 13.2 K/uL    RBC 3.53 (L) 4.20 - 5.30 M/uL    HGB 11.4 (L) 12.0 - 16.0 g/dL    HCT 34.4 (L) 35.0 - 45.0 %    MCV 97.5 78.0 - 100.0 FL    MCH 32.3 24.0 - 34.0 PG    MCHC 33.1 31.0 - 37.0 g/dL    RDW 16.3 (H) 11.6 - 14.5 %    PLATELET 030 (L) 765 - 420 K/uL    MPV 13.7 (H) 9.2 - 11.8 FL    NRBC 0.0 0  WBC    ABSOLUTE NRBC 0.00 0.00 - 0.01 K/uL    NEUTROPHILS 65 40 - 73 %    LYMPHOCYTES 28 21 - 52 %    MONOCYTES 5 3 - 10 %    EOSINOPHILS 2 0 - 5 %    BASOPHILS 1 0 - 2 %    IMMATURE GRANULOCYTES 1 (H) 0.0 - 0.5 %    ABS. NEUTROPHILS 3.7 1.8 - 8.0 K/UL    ABS. LYMPHOCYTES 1.6 0.9 - 3.6 K/UL    ABS. MONOCYTES 0.3 0.05 - 1.2 K/UL    ABS. EOSINOPHILS 0.1 0.0 - 0.4 K/UL    ABS. BASOPHILS 0.0 0.0 - 0.1 K/UL    ABS. IMM. GRANS. 0.0 0.00 - 0.04 K/UL    DF AUTOMATED     METABOLIC PANEL, COMPREHENSIVE    Collection Time: 11/24/22  3:00 PM   Result Value Ref Range    Sodium 139 136 - 145 mmol/L    Potassium 4.0 3.5 - 5.5 mmol/L    Chloride 111 100 - 111 mmol/L    CO2 19 (L) 21 - 32 mmol/L    Anion gap 9 3.0 - 18 mmol/L    Glucose 117 (H) 74 - 99 mg/dL    BUN 16 7.0 - 18 MG/DL    Creatinine 1.59 (H) 0.6 - 1.3 MG/DL    BUN/Creatinine ratio 10 (L) 12 - 20      eGFR 32 (L) >60 ml/min/1.73m2    Calcium 8.6 8.5 - 10.1 MG/DL    Bilirubin, total 0.7 0.2 - 1.0 MG/DL    ALT (SGPT) 25 13 - 56 U/L    AST (SGOT) 48 (H) 10 - 38 U/L    Alk.  phosphatase 197 (H) 45 - 117 U/L    Protein, total 8.6 (H) 6.4 - 8.2 g/dL    Albumin 2.5 (L) 3.4 - 5.0 g/dL    Globulin 6.1 (H) 2.0 - 4.0 g/dL    A-G Ratio 0.4 (L) 0.8 - 1.7     LIPASE    Collection Time: 11/24/22  3:00 PM   Result Value Ref Range    Lipase 129 73 - 393 U/L   MAGNESIUM    Collection Time: 11/24/22  3:00 PM   Result Value Ref Range    Magnesium 1.6 1.6 - 2.6 mg/dL   TROPONIN-HIGH SENSITIVITY    Collection Time: 11/24/22  3:00 PM   Result Value Ref Range    Troponin-High Sensitivity 7 0 - 54 ng/L   AMMONIA    Collection Time: 11/24/22  3:00 PM   Result Value Ref Range    Ammonia, plasma 24 11 - 32 UMOL/L   DRUG SCREEN, URINE    Collection Time: 11/24/22  4:15 PM   Result Value Ref Range    BENZODIAZEPINES Negative NEG      BARBITURATES Negative NEG      THC (TH-CANNABINOL) Negative NEG      OPIATES Positive (A) NEG      PCP(PHENCYCLIDINE) Negative NEG      COCAINE Negative NEG      AMPHETAMINES Negative NEG      METHADONE Negative NEG      HDSCOM (NOTE)    URINALYSIS W/ RFLX MICROSCOPIC    Collection Time: 11/24/22  4:15 PM   Result Value Ref Range    Color DARK YELLOW      Appearance CLEAR      Specific gravity 1.020 1.005 - 1.030      pH (UA) 5.0 5.0 - 8.0      Protein TRACE (A) NEG mg/dL    Glucose Negative NEG mg/dL    Ketone TRACE (A) NEG mg/dL    Bilirubin Negative NEG      Blood Negative NEG      Urobilinogen 1.0 0.2 - 1.0 EU/dL    Nitrites Negative NEG      Leukocyte Esterase TRACE (A) NEG     URINE MICROSCOPIC ONLY    Collection Time: 11/24/22  4:15 PM   Result Value Ref Range    WBC 0 to 3 0 - 5 /hpf    RBC 0 to 3 0 - 5 /hpf    Epithelial cells FEW 0 - 5 /lpf    Bacteria FEW (A) NEG /hpf    Hyaline cast 4 to 10 0 - 2 /lpf       Radiologic Studies -   XR CHEST PORT   Final Result      No acute findings in the chest.        CT Results  (Last 48 hours)      None          CXR Results  (Last 48 hours)                 11/24/22 1520  XR CHEST PORT Final result    Impression:      No acute findings in the chest.       Narrative:  EXAM: PORTABLE  FRONTAL CHEST RADIOGRAPH       CLINICAL INDICATION/HISTORY: Altered mental status. History of hypertension and   dementia. COMPARISON: None       TECHNIQUE: Portable frontal view of the chest       _______________       FINDINGS:       SUPPORT DEVICES: EKG leads overlie the patient. HEART AND MEDIASTINUM: Normal heart size and mediastinal contours. LUNGS: No suspicious pulmonary opacities. PLEURAL SPACES:No large pneumothorax. No large pleural effusion. BONY THORAX AND SOFT TISSUES: No acute abnormality. Multilevel degenerative   changes throughout the spine and both shoulders.] Right upper abdomen   cholecystectomy clips.       _______________                   Medications given in the ED-  Medications   naloxone [facility]) injection 2 mg (2 mg IntraVENous Given 11/24/22 1510)   naloxone (NARCAN) injection 2 mg (2 mg IntraNASal Given 11/24/22 1451)       Procedures     Procedures    ED Course     I Roxana Garcia MD) am the first provider for this patient. I reviewed the vital signs, available nursing notes, past medical history, past surgical history, family history and social history. Records Reviewed: Nursing Notes    Cardiac Monitor:  Rate: 81 bpm  Rhythm: sinus rhythm    Pulse Oximetry Analysis - 100% on RA    EKG interpretation: (Preliminary)  Rhythm: NSR. Rate: 86 bpm; no STEMI  EKG read by Roxana Garcia MD at 2:53 PM    2:54 PM Initial assessment performed. The patients presenting problems have been discussed, and they are in agreement with the care plan formulated and outlined with them. I have encouraged them to ask questions as they arise throughout their visit. ED Course as of 11/24/22 1857   Thu Nov 24, 2022   1455 Patient was given 2 mg of intranasal Narcan and rapidly aroused. [JM]   5034 Pt able to ambulate without ataxia or assistance.   [JM]      ED Course User Index  [JM] Hedy Pham MD         Medical Decision Making     Provider Notes (Medical Decision Making):   DDX: Opioid abuse, heroin abuse, opiate overdose    Discussion:  81 y.o. female presenting acutely with an opioid overdose. Patient was given multiple IV doses and IN doses of Narcan upon arrival.  She has since remained calm and has been breathing spontaneously. Patient is ultimately been able to ambulate without assistance or ataxia. Cautioned her not to take medications in this class or medications that are prescribed for her in the future. Patient and friend understand and agree with this plan. Narcan prescribed for home use. Critical Care Time:   I have spent 45 minutes of critical care time involved in lab review, consultations with specialist, family decision-making, and documentation. This time does not include time spent on separately billable procedures. During this entire length of time I was immediately available to the patient. Critical Care: The reason for providing this level of medical care for this critically ill patient was due a critical illness that impaired one or more vital organ systems such that there was a high probability of imminent or life threatening deterioration in the patients condition. This care involved high complexity decision making to assess, manipulate, and support vital system functions, to treat this degreee vital organ system failure and to prevent further life threatening deterioration of the patients condition. Multiple IM and IV doses of naloxone for reversal of opioid overdose    Tavia Paez MD    Diagnosis and Disposition     DISCHARGE NOTE:  6:57 PM   Jayna Lynne  results have been reviewed with her. She has been counseled regarding her diagnosis, treatment, and plan. She verbally conveys understanding and agreement of the signs, symptoms, diagnosis, treatment and prognosis and additionally agrees to follow up as discussed. She also agrees with the care-plan and conveys that all of her questions have been answered.   I have also provided discharge instructions for her that include: educational information regarding their diagnosis and treatment, and list of reasons why they would want to return to the ED prior to their follow-up appointment, should her condition change. She has been provided with education for proper emergency department utilization. CLINICAL IMPRESSION:    1. Opioid overdose, undetermined intent, subsequent encounter    2. Respiratory depression        PLAN:  1. D/C Home  2. Current Discharge Medication List        START taking these medications    Details   naloxone (NARCAN) 4 mg/actuation nasal spray Use 1 spray intranasally, then discard. Repeat with new spray every 2 min as needed for opioid overdose symptoms, alternating nostrils. Qty: 1 Each, Refills: 0  Start date: 11/24/2022           3. Follow-up Information       Follow up With Specialties Details Why [address] [facility]  Schedule an appointment as soon as possible for a visit  As soon as possible, For follow up from Emergency Department visit. [place], [address]  [address], [address] [facility]  [phone]    [address]  [phone]    Sandy Hansen DO Family Medicine Schedule an appointment as soon as possible for a visit  As soon as possible, For follow up from Emergency Department visit. [address] [facility]      [facility] EMERGENCY DEPT Emergency Medicine  As needed; If symptoms worsen [address] [place] [phone] Ugo Christine MD am the primary clinician of record. [place] Disclaimer     Please note that this dictation was completed with [employer], the computer voice recognition software.   Quite often unanticipated grammatical, syntax, homophones, and other interpretive errors are inadvertently transcribed by the computer software. Please disregard these errors. Please excuse any errors that have escaped final proofreading.     Claudine Goodpasture, MD

## 2022-11-24 NOTE — DISCHARGE INSTRUCTIONS
Follow-up with your primary care doctor. Do not take medications that are not prescribed for you. Return to the ED for worsening symptoms or for other concerns.

## 2022-11-27 LAB
ATRIAL RATE: 86 BPM
CALCULATED P AXIS, ECG09: 7 DEGREES
CALCULATED R AXIS, ECG10: 31 DEGREES
CALCULATED T AXIS, ECG11: 43 DEGREES
DIAGNOSIS, 93000: NORMAL
P-R INTERVAL, ECG05: 154 MS
Q-T INTERVAL, ECG07: 420 MS
QRS DURATION, ECG06: 80 MS
QTC CALCULATION (BEZET), ECG08: 502 MS
VENTRICULAR RATE, ECG03: 86 BPM

## 2022-12-19 ENCOUNTER — TELEPHONE (OUTPATIENT)
Dept: HEMATOLOGY | Age: 82
End: 2022-12-19

## 2022-12-19 DIAGNOSIS — K74.60 CIRRHOSIS OF LIVER WITHOUT ASCITES, UNSPECIFIED HEPATIC CIRRHOSIS TYPE (HCC): Primary | ICD-10-CM

## 2022-12-19 DIAGNOSIS — D50.9 IRON DEFICIENCY ANEMIA, UNSPECIFIED IRON DEFICIENCY ANEMIA TYPE: ICD-10-CM

## 2022-12-19 NOTE — TELEPHONE ENCOUNTER
Patient states she feels really bad, fatigue, not having any energy, having trouble controlling her BM, weakness. Patient states she usually feels like this when she need an iron infusion. Patient also states she is not taking Hernandez.     [facility] phone # [phone], Minnie Denson, HIPAA verified

## 2022-12-21 NOTE — TELEPHONE ENCOUNTER
Patient called back again today 12/21/22 and states patient has now developed an rash on her leg. Per NP Tee Kidd \" Please have her come in for lab work. I put in the orders already. If low I can put in for iron infusions.  Also patient needs to be seen by PCP or ER for rash \"

## 2023-02-06 ENCOUNTER — OFFICE VISIT (OUTPATIENT)
Dept: HEMATOLOGY | Age: 83
End: 2023-02-06
Payer: MEDICARE

## 2023-02-06 VITALS
OXYGEN SATURATION: 98 % | HEART RATE: 76 BPM | BODY MASS INDEX: 22.08 KG/M2 | HEIGHT: 62 IN | WEIGHT: 120 LBS | DIASTOLIC BLOOD PRESSURE: 56 MMHG | TEMPERATURE: 97 F | SYSTOLIC BLOOD PRESSURE: 104 MMHG

## 2023-02-06 DIAGNOSIS — K74.3 PRIMARY BILIARY CHOLANGITIS (HCC): ICD-10-CM

## 2023-02-06 DIAGNOSIS — K74.60 CIRRHOSIS OF LIVER WITHOUT ASCITES, UNSPECIFIED HEPATIC CIRRHOSIS TYPE (HCC): Primary | ICD-10-CM

## 2023-02-06 PROCEDURE — G8420 CALC BMI NORM PARAMETERS: HCPCS | Performed by: NURSE PRACTITIONER

## 2023-02-06 PROCEDURE — G8536 NO DOC ELDER MAL SCRN: HCPCS | Performed by: NURSE PRACTITIONER

## 2023-02-06 PROCEDURE — 3078F DIAST BP <80 MM HG: CPT | Performed by: NURSE PRACTITIONER

## 2023-02-06 PROCEDURE — 1090F PRES/ABSN URINE INCON ASSESS: CPT | Performed by: NURSE PRACTITIONER

## 2023-02-06 PROCEDURE — G8432 DEP SCR NOT DOC, RNG: HCPCS | Performed by: NURSE PRACTITIONER

## 2023-02-06 PROCEDURE — 1123F ACP DISCUSS/DSCN MKR DOCD: CPT | Performed by: NURSE PRACTITIONER

## 2023-02-06 PROCEDURE — 99214 OFFICE O/P EST MOD 30 MIN: CPT | Performed by: NURSE PRACTITIONER

## 2023-02-06 PROCEDURE — 1101F PT FALLS ASSESS-DOCD LE1/YR: CPT | Performed by: NURSE PRACTITIONER

## 2023-02-06 PROCEDURE — G0463 HOSPITAL OUTPT CLINIC VISIT: HCPCS | Performed by: NURSE PRACTITIONER

## 2023-02-06 PROCEDURE — 3074F SYST BP LT 130 MM HG: CPT | Performed by: NURSE PRACTITIONER

## 2023-02-06 PROCEDURE — G8427 DOCREV CUR MEDS BY ELIG CLIN: HCPCS | Performed by: NURSE PRACTITIONER

## 2023-02-06 PROCEDURE — G8400 PT W/DXA NO RESULTS DOC: HCPCS | Performed by: NURSE PRACTITIONER

## 2023-02-06 PROCEDURE — 91200 LIVER ELASTOGRAPHY: CPT | Performed by: NURSE PRACTITIONER

## 2023-02-06 NOTE — PROGRESS NOTES
[facility], MD, Alirio Mack, Jean Param Umu, [facility], ANUP Sher, [facility]   Meme Homar, [facility]   Reina Marquez, FNP-C  Michell Whitt, FNP-C   Gladis Jolly, [facility]      [address]   at [address], [facility] Evelio Noland  22. [phone]   FAX: [address]   at [address], [address] [place] [address], [address]   [phone]   FAX: [phone]     Patient Care Team:  Anton Anderson DO as PCP - General (Family Medicine)  Trinidad Phoenix MD as Referring Provider (Hematology and Oncology)  Thompson Millan MD as Physician (Hematology and Oncology)  Savanah Calles MD as Physician (Gastroenterology)    Problem List  Date Reviewed: 2/6/2023            Codes Class Noted    Primary biliary cholangitis [facility]) ICD-10-CM: K74.3  ICD-9-CM: 571.6  2/6/2023        Iron deficiency anemia, unspecified ICD-10-CM: D50.9  ICD-9-CM: 280.9  8/29/2022        Dementia ([facility] ICD-10-CM: F03.90  ICD-9-CM: 294.20  8/16/2022        Cirrhosis of liver without ascites ([facility] ICD-10-CM: K74.60  ICD-9-CM: 571.5  8/16/2022        Hypertension ICD-10-CM: I10  ICD-9-CM: 401.9  8/16/2022         [facility] cirrhosis that is presumed   secondary to Primary Biliary Cholangitis. The active problem list, all pertinent past medical history, medications, liver histology, endoscopic studies, radiologic findings, and laboratory findings related to the liver disorder were reviewed and discussed with the patient. The patient is a 81 y.o. female who was found to have elevated liver enzymes since 2018      Serologic evaluation for markers of chronic liver are positive for AMA. MRI of the liver was performed in 4/2022. The results of the imaging suggested cirrhosis. Previously described suspicious lesion in the subcapsular posterior right lobeof the liver appears unchanged in size    Assessment of liver fibrosis with Fibroscan was performed in the office today. The result was 47 kPa which correlates with cirrhosis. The patient has the following symptoms which could be attributed to the liver disorder:  fatigue, diffuse abdominal pain, hematochezia (occasionally)     The patient is not experiencing the following symptoms which are commonly seen in this liver disorder: pain in the right side over the liver, nausea, swelling of the abdomen, swelling of the lower extremities, hematemesis. The patient has mild to moderate limitations in functional activities which can be attributed to the liver disease and to other medical problems that are not related to the liver disease. ASSESSMENT AND PLAN:  Cirrhosis  Cirrhosis presumed secondary to [facility]    The diagnosis of cirrhosis is based upon imaging and laboratory studies. AST is elevated. ALT is normal. ALP is elevated. Liver function is depressed. Serum albumin is depressed. The platelet count is depressed. Based upon laboratory studies, imaging, and Fibroscan the patient appears to have cirrhosis. Have performed laboratory testing to monitor liver function and degree of liver injury. This included   BMP, hepatic panel, CBC with platelet count, INR. Serologic testing for causes of chronic liver disease was positive for AMA. The patient has depressed but stable liver function. The patient has never developed any complications of cirrhosis to date. The CTP is 7. Child class B. The MELD score is 13. The patient is not a candidate for liver transplantation because of advanced age. Primary Biliary Cholangitis  The diagnosis is based upon serology and an elevation in ALP. A liver biopsy has not been performed.       The AMA is strongly positive and the ALP is significantly elevated. The patient meets criteria for PBC and histologic confirmation is not necessary at this time. The patient will be started on SOREN at a dose of 800 mg every day. Her caregiver called and reported patient did not tolerate medication and was complaining of back pain and developing a rash shortly after starting medication. The patient refuses to take the medication      Screening for Esophageal varices   The patient had an EGD performed by Dr. Shyla Donaldson in 9/2022. No mention of esophageal varices. Hypercholesterolemia   This is common in patients with PBC. Controlled clinical trials demonstrate that women with PBC do not have an increased risk of CAD depsite the elevated total cholesterol. The cholesterol is typically HDL and does not always require treatment. Statins are not contraindicated if felt to be clinically warranted. Vitamin malabsorption  Patients with PBC do not efficiently absorb fat soluble vitamins A,D,E, K. It has been recommended that the patient take multivitamins with excess fat soluble vitamins. Breast cancer screeing   Women with PBC have an increased risk of breast cancer and should undergo regular mammography screenings. Hepatic encephalopathy   This has not developed to date. There is no need for treatment with lactulose and/or Xifaxan at this time. The patient has dementia and is on medication. 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 There is no evidence of overt bleeding. No treatment is required. 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 The patient is receiving iron infusions which are managed by [facility] for Hepatocellular Carcinoma  [facility]. screening performed in 4/2022 demonstrated a \"previously described suspicious lesion\" unchanged.      Patient is scheduled for abdominal ultrasound from GI office. Treatment of other medical problems in patients with chronic liver disease  There are no contraindications for the patient to take any medications that are necessary for treatment of other medical issues. Normal doses of acetaminophen can be used for pain as needed. Normal doses of acetaminophen as recommended on the label are not hepatotoxic, even in patients with cirrhosis. The patient has cirrhosis. Patients with cirrhosis should not use NSAIDs if possible as this is associated with a higher rate of ZELDA. Counseling for alcohol in patients with chronic liver disease  The patient was counseled regarding alcohol consumption and the effect of alcohol on chronic liver disease. The patient does not consume any alcohol. Osteoporosis  The risk of osteoporosis is increased in patients with cirrhosis. DEXA bone density to assess for osteoporosis has not been performed. This should be ordered by the patients primary care physician. Treatment of other medical problems in patients with chronic liver disease  There are no contraindications for the patient to take most medications that are necessary for treatment of other medical issues. The patient has cirrhosis and should avoid taking NSAIDs which are associated with a higher rate of developing ZELDA. The patient can take any medications utilized for treatment of DM, statins to treat hypercholesterolemia    Normal doses of acetaminophen, as recommended on the label of the bottle, are not hepatotoxic except in the setting of daily alcohol use, even in patients with cirrhosis and can be utilized for pain. Counseling for alcohol in patients with chronic liver disease  The patient has cirrhosis and was advised to be abstinent from all alcohol including non-alcoholic beer which does contain some alcohol. The patient does not consume any alcohol.       Vaccinations   Vaccination for viral hepatitis B is recommended since the patient has no serologic evidence of previous exposure or vaccination with immunity. Vaccination for viral hepatitis A is not needed. The patient has serologic evidence of prior exposure or vaccination with immunity. The patient has not received of COVID-19 vaccine. Routine vaccinations against other bacterial and viral agents can be performed as indicated. Annual flu vaccination should be administered if indicated. ALLERGIES  No Known Allergies    MEDICATIONS  Current Outpatient Medications   Medication Sig    naloxone (NARCAN) 4 mg/actuation nasal spray Use 1 spray intranasally, then discard. Repeat with new spray every 2 min as needed for opioid overdose symptoms, alternating nostrils. benazepril-hydroCHLOROthiazide (LOTENSIN HCT) 20-12.5 mg per tablet Take 1 Tablet by mouth in the morning. HYDROcodone-acetaminophen (NORCO) 5-325 mg per tablet take 1 tablet by mouth every 4 hours if needed for pain    donepeziL (ARICEPT) 10 mg tablet donepezil 10 mg tablet   take 1 tablet by mouth every evening    memantine (NAMENDA) 10 mg tablet Take 10 mg by mouth two (2) times a day. methocarbamoL (ROBAXIN) 750 mg tablet methocarbamol 750 mg tablet    pantoprazole (PROTONIX) 40 mg tablet Take 40 mg by mouth two (2) times a day. No current facility-administered medications for this visit. SYSTEM REVIEW NOT RELATED TO LIVER DISEASE OR REVIEWED ABOVE:  Constitution systems: Negative for fever, chills, weight gain, weight loss. Eyes: Negative for visual changes. ENT: Negative for sore throat, painful swallowing. Respiratory: Negative for cough, hemoptysis, SOB. Cardiology: Negative for chest pain, palpitations. GI:  Negative for constipation or diarrhea. : Negative for urinary frequency, dysuria, hematuria, nocturia. Skin: Negative for rash. Hematology: Negative for easy bruising, blood clots. Musculo-skelatal: Negative for back pain, muscle pain, weakness.   Neurologic: Negative for headaches, dizziness, vertigo, memory problems not related to HE. Psychology: Negative for anxiety, depression. FAMILY HISTORY:  The patient has no knowledge of the father's medical condition. The patient has no knowledge of the mother's medical condition. SOCIAL HISTORY:  The patient is . The patient has 4 children, 1 grandchild. 1 great grandchild  The patient has never used tobacco products. The patient has never consumed significant amounts of alcohol. The patient is retired. PHYSICAL EXAMINATION:  Visit Vitals  BP (!) 104/56   Pulse 76   Temp 97 °F (36.1 °C)   Ht 5' 2\" (1.575 m)   Wt 120 lb (54.4 kg)   SpO2 98%   BMI 21.95 kg/m²       General: No acute distress. Eyes: Sclera anicteric. ENT: No oral lesions. Thyroid normal.  Nodes: No adenopathy. Skin: No spider angiomata. No jaundice. No palmar erythema. Respiratory: Lungs clear to auscultation. Cardiovascular: Regular heart rate. No murmurs. No JVD. Abdomen: Soft non-tender. Liver size normal to percussion/palpation. Spleen not palpable. No obvious ascites. Extremities: No edema. No muscle wasting. No gross arthritic changes. Neurologic: Alert and oriented. Cranial nerves grossly intact. No asterixis.     LABORATORY STUDIES:  Liver [place] [address] Units 11/24/2022   WBC 4.6 - 13.2 K/uL 5.7   ANC 1.8 - 8.0 K/UL 3.7   HGB 12.0 - 16.0 g/dL 11.4 (L)    - 420 K/uL 117 (L)   AST 10 - 38 U/L 48 (H)   ALT 13 - 56 U/L 25   Alk Phos 45 - 117 U/L 197 (H)   Bili, Total 0.2 - 1.0 MG/DL 0.7   Albumin 3.4 - 5.0 g/dL 2.5 (L)   BUN 7.0 - 18 MG/DL 16   Creat 0.6 - 1.3 MG/DL 1.59 (H)   Na 136 - 145 mmol/L 139   K 3.5 - 5.5 mmol/L 4.0   Cl 100 - 111 mmol/L 111   CO2 21 - 32 mmol/L 19 (L)   Glucose 74 - 99 mg/dL 117 (H)   Magnesium 1.6 - 2.6 mg/dL 1.6   Ammonia 11 - 32 UMOL/L 24       SEROLOGIES:  Serologies Latest Ref Rng & Units 8/16/2022   Hep A Ab, Total Negative   Positive (A) Hep B Surface Ag <1.00 Index <0.10   Hep B Surface Ag Interp NEG   Negative   Hep B Core Ab, Total Negative   Negative   Hep B Surface Ab >10.0 mIU/mL <3.10 (L)   Hep B Surface Ab Interp POS   Negative (A)   Hep C Ab 0.0 - 0.9 s/co ratio <0.1   Ferritin 8 - 388 NG/ML 13   Iron % Saturation 20 - 50 % 9 (L)   ASMCA 0 - 19 Units 59 (H)   M2 Ab 0.0 - 20.0 Units 120.1 (H)       LIVER HISTOLOGY:  2/2023. FibroScan performed at [address]. EkPa was 47.0. IQR/med 29%. The results suggested a fibrosis level of F4. ENDOSCOPIC PROCEDURES:  Not available or performed    RADIOLOGY:  4/2022. Dynamic MRI liver. Changes consistent with cirrhosis. Previously described suspicious lesion in the subcapsular posterior right lobe of the liver appears unchanged in size. No dilated bile ducts. No bile duct strictures. No ascites. OTHER TESTING:  Not available or performed    FOLLOW-UP:  All of the issues listed above in the Assessment and Plan were discussed with the patient. All questions were answered. The patient expressed a clear understanding of the above. [facility] in 6 months for routine monitoring.       Dane Stone, AGPCNP-BC  [address] Liver [place] [address], [facility] [address]  [facility], [address] [place] [address] Street - [address]  [phone]